# Patient Record
Sex: FEMALE | Race: WHITE | NOT HISPANIC OR LATINO | URBAN - METROPOLITAN AREA
[De-identification: names, ages, dates, MRNs, and addresses within clinical notes are randomized per-mention and may not be internally consistent; named-entity substitution may affect disease eponyms.]

---

## 2021-03-17 ENCOUNTER — OUTPATIENT (OUTPATIENT)
Dept: OUTPATIENT SERVICES | Facility: HOSPITAL | Age: 46
LOS: 1 days | Discharge: HOME | End: 2021-03-17
Payer: COMMERCIAL

## 2021-03-17 PROCEDURE — 72197 MRI PELVIS W/O & W/DYE: CPT | Mod: 26

## 2025-02-21 PROBLEM — Z00.00 ENCOUNTER FOR PREVENTIVE HEALTH EXAMINATION: Status: ACTIVE | Noted: 2025-02-21

## 2025-03-03 PROBLEM — Z80.3 FAMILY HISTORY OF MALIGNANT NEOPLASM OF BREAST: Status: ACTIVE | Noted: 2025-03-03

## 2025-03-03 NOTE — PAST MEDICAL HISTORY
[Menstruating] : The patient is menstruating [Menarche Age ____] : age at menarche was [unfilled] [Excessive Bleeding] : there was excessive bleeding [Total Preg ___] : G[unfilled] [Living ___] : Living: [unfilled]

## 2025-03-07 ENCOUNTER — APPOINTMENT (OUTPATIENT)
Dept: GYNECOLOGIC ONCOLOGY | Facility: CLINIC | Age: 50
End: 2025-03-07
Payer: COMMERCIAL

## 2025-03-07 VITALS
SYSTOLIC BLOOD PRESSURE: 167 MMHG | HEIGHT: 60 IN | WEIGHT: 166 LBS | DIASTOLIC BLOOD PRESSURE: 97 MMHG | HEART RATE: 111 BPM | BODY MASS INDEX: 32.59 KG/M2

## 2025-03-07 DIAGNOSIS — D25.0 SUBMUCOUS LEIOMYOMA OF UTERUS: ICD-10-CM

## 2025-03-07 DIAGNOSIS — Z80.3 FAMILY HISTORY OF MALIGNANT NEOPLASM OF BREAST: ICD-10-CM

## 2025-03-07 DIAGNOSIS — N92.0 EXCESSIVE AND FREQUENT MENSTRUATION WITH REGULAR CYCLE: ICD-10-CM

## 2025-03-07 PROCEDURE — 99204 OFFICE O/P NEW MOD 45 MIN: CPT

## 2025-03-07 PROCEDURE — 99459 PELVIC EXAMINATION: CPT | Mod: NC

## 2025-03-07 NOTE — ASSESSMENT
[FreeTextEntry1] : 49-year-old, para 2-0-0-2,  x 2, patient of Dr. Guillen with recurrent menorrhagia not responsive to multiple conservative management, now presenting for definitive surgery.

## 2025-03-07 NOTE — PLAN
[TextEntry] : EUA TLH/BS and all indicated procedures. Options for surgical management discussed. Procedures offered patient included laparoscopic hysterectomy with bilateral salpingectomies along with possible bilateral oophorectomies. She is requesting to preserve her ovaries if normal and is opting for  a TLH/BS.  The risk benefits and alternative to the recommended treatments were discussed. She was informed about potential complications of surgery including but not limited to bowel, bladder, and ureteral injuries. Infectious morbidity, along with bleeding and thromboembolic events were also discussed.   She showed clear understanding, was given the opportunity to ask questions and is amenable to the above surgical treatment. The patient will be setup for the above procedure in the near future.

## 2025-03-07 NOTE — PHYSICAL EXAM
[Chaperone Present] : A chaperone was present in the examining room during all aspects of the physical examination [FreeTextEntry2] : Justine Vivas [TextEntry] : Well - developed, well-nourished female in no acute distress HEENT - wnl Breasts - symmetrical w/o masses or nipple discharge Abdomen - soft, non-tender, +BS Back - no CVA tenderness or spinal tenderness Extremities - w/o clubbing, cyanosis, no lesions noted Neuro - AAOx3  Pelvic Exam External Genitalia - with out lesions Vagina - mucosa atrophic, no lesions noted Cervix - no lesions Uterus - 16 wk size, nontender, mobile Adnexa - no palpable masses or tenderness b/l Rectovaginal - confirmatory

## 2025-03-07 NOTE — HISTORY OF PRESENT ILLNESS
[FreeTextEntry1] : 49 year old, para 2-0-0-2,  x 2, patient of Dr. Guillen. LMP unknown, patient states she has been bleeding x 6 weeks. She does not recall her last normal period. She has a history of D&C in , Uterine Fibroid Embolization in , and Lupron therapy for menorrhagia without success.  She had IUD inserted approximately 1.5 years ago with symptoms resolving.  She states she started bleeding again about 6 weeks ago and is presently on Tranaxemic Acid. She has a family history of breast cancer, (grandmother and aunt) but is BRCA negative.  She presents today for initial office evaluation, requesting definitive hysterectomy.

## 2025-03-14 ENCOUNTER — OUTPATIENT (OUTPATIENT)
Dept: OUTPATIENT SERVICES | Facility: HOSPITAL | Age: 50
LOS: 1 days | End: 2025-03-14
Payer: COMMERCIAL

## 2025-03-14 VITALS
TEMPERATURE: 98 F | WEIGHT: 169.98 LBS | RESPIRATION RATE: 18 BRPM | OXYGEN SATURATION: 98 % | HEART RATE: 80 BPM | DIASTOLIC BLOOD PRESSURE: 90 MMHG | HEIGHT: 60 IN | SYSTOLIC BLOOD PRESSURE: 130 MMHG

## 2025-03-14 DIAGNOSIS — D25.9 LEIOMYOMA OF UTERUS, UNSPECIFIED: Chronic | ICD-10-CM

## 2025-03-14 DIAGNOSIS — D25.0 SUBMUCOUS LEIOMYOMA OF UTERUS: ICD-10-CM

## 2025-03-14 DIAGNOSIS — Z98.890 OTHER SPECIFIED POSTPROCEDURAL STATES: Chronic | ICD-10-CM

## 2025-03-14 DIAGNOSIS — Z90.89 ACQUIRED ABSENCE OF OTHER ORGANS: Chronic | ICD-10-CM

## 2025-03-14 DIAGNOSIS — Z01.818 ENCOUNTER FOR OTHER PREPROCEDURAL EXAMINATION: ICD-10-CM

## 2025-03-14 LAB
ALBUMIN SERPL ELPH-MCNC: 4.3 G/DL — SIGNIFICANT CHANGE UP (ref 3.5–5.2)
ALP SERPL-CCNC: 67 U/L — SIGNIFICANT CHANGE UP (ref 30–115)
ALT FLD-CCNC: 39 U/L — SIGNIFICANT CHANGE UP (ref 0–41)
ANION GAP SERPL CALC-SCNC: 9 MMOL/L — SIGNIFICANT CHANGE UP (ref 7–14)
AST SERPL-CCNC: 32 U/L — SIGNIFICANT CHANGE UP (ref 0–41)
BASOPHILS # BLD AUTO: 0.05 K/UL — SIGNIFICANT CHANGE UP (ref 0–0.2)
BASOPHILS NFR BLD AUTO: 0.7 % — SIGNIFICANT CHANGE UP (ref 0–1)
BILIRUB SERPL-MCNC: 0.3 MG/DL — SIGNIFICANT CHANGE UP (ref 0.2–1.2)
BLD GP AB SCN SERPL QL: SIGNIFICANT CHANGE UP
BUN SERPL-MCNC: 18 MG/DL — SIGNIFICANT CHANGE UP (ref 10–20)
CALCIUM SERPL-MCNC: 10 MG/DL — SIGNIFICANT CHANGE UP (ref 8.4–10.5)
CHLORIDE SERPL-SCNC: 104 MMOL/L — SIGNIFICANT CHANGE UP (ref 98–110)
CO2 SERPL-SCNC: 26 MMOL/L — SIGNIFICANT CHANGE UP (ref 17–32)
CREAT SERPL-MCNC: 0.6 MG/DL — LOW (ref 0.7–1.5)
EGFR: 111 ML/MIN/1.73M2 — SIGNIFICANT CHANGE UP
EGFR: 111 ML/MIN/1.73M2 — SIGNIFICANT CHANGE UP
EOSINOPHIL # BLD AUTO: 0.14 K/UL — SIGNIFICANT CHANGE UP (ref 0–0.7)
EOSINOPHIL NFR BLD AUTO: 1.9 % — SIGNIFICANT CHANGE UP (ref 0–8)
GLUCOSE SERPL-MCNC: 84 MG/DL — SIGNIFICANT CHANGE UP (ref 70–99)
HCT VFR BLD CALC: 40.1 % — SIGNIFICANT CHANGE UP (ref 37–47)
HGB BLD-MCNC: 13.2 G/DL — SIGNIFICANT CHANGE UP (ref 12–16)
IMM GRANULOCYTES NFR BLD AUTO: 0.5 % — HIGH (ref 0.1–0.3)
LYMPHOCYTES # BLD AUTO: 2.11 K/UL — SIGNIFICANT CHANGE UP (ref 1.2–3.4)
LYMPHOCYTES # BLD AUTO: 28.4 % — SIGNIFICANT CHANGE UP (ref 20.5–51.1)
MCHC RBC-ENTMCNC: 28.9 PG — SIGNIFICANT CHANGE UP (ref 27–31)
MCHC RBC-ENTMCNC: 32.9 G/DL — SIGNIFICANT CHANGE UP (ref 32–37)
MCV RBC AUTO: 87.9 FL — SIGNIFICANT CHANGE UP (ref 81–99)
MONOCYTES # BLD AUTO: 0.62 K/UL — HIGH (ref 0.1–0.6)
MONOCYTES NFR BLD AUTO: 8.3 % — SIGNIFICANT CHANGE UP (ref 1.7–9.3)
NEUTROPHILS # BLD AUTO: 4.47 K/UL — SIGNIFICANT CHANGE UP (ref 1.4–6.5)
NEUTROPHILS NFR BLD AUTO: 60.2 % — SIGNIFICANT CHANGE UP (ref 42.2–75.2)
NRBC BLD AUTO-RTO: 0 /100 WBCS — SIGNIFICANT CHANGE UP (ref 0–0)
PLATELET # BLD AUTO: 277 K/UL — SIGNIFICANT CHANGE UP (ref 130–400)
PMV BLD: 11.8 FL — HIGH (ref 7.4–10.4)
POTASSIUM SERPL-MCNC: 4.5 MMOL/L — SIGNIFICANT CHANGE UP (ref 3.5–5)
POTASSIUM SERPL-SCNC: 4.5 MMOL/L — SIGNIFICANT CHANGE UP (ref 3.5–5)
PROT SERPL-MCNC: 6.4 G/DL — SIGNIFICANT CHANGE UP (ref 6–8)
RBC # BLD: 4.56 M/UL — SIGNIFICANT CHANGE UP (ref 4.2–5.4)
RBC # FLD: 13 % — SIGNIFICANT CHANGE UP (ref 11.5–14.5)
SODIUM SERPL-SCNC: 139 MMOL/L — SIGNIFICANT CHANGE UP (ref 135–146)
WBC # BLD: 7.43 K/UL — SIGNIFICANT CHANGE UP (ref 4.8–10.8)
WBC # FLD AUTO: 7.43 K/UL — SIGNIFICANT CHANGE UP (ref 4.8–10.8)

## 2025-03-14 PROCEDURE — 86901 BLOOD TYPING SEROLOGIC RH(D): CPT

## 2025-03-14 PROCEDURE — 36415 COLL VENOUS BLD VENIPUNCTURE: CPT

## 2025-03-14 PROCEDURE — 86900 BLOOD TYPING SEROLOGIC ABO: CPT

## 2025-03-14 PROCEDURE — 80053 COMPREHEN METABOLIC PANEL: CPT

## 2025-03-14 PROCEDURE — 99214 OFFICE O/P EST MOD 30 MIN: CPT | Mod: 25

## 2025-03-14 PROCEDURE — 85025 COMPLETE CBC W/AUTO DIFF WBC: CPT

## 2025-03-14 PROCEDURE — 86850 RBC ANTIBODY SCREEN: CPT

## 2025-03-14 NOTE — H&P PST ADULT - REASON FOR ADMISSION
Patient is a  49 year old  female presenting to PAST in preparation for   EXAM UNDER ANESTHESIA TOTAL LAPAROSCOPIC HYSTERECTOMY BILATERAL SALPINGECTOMY AND ALL OTHER INDICATED PROCEDURES  on 3/20/25   under  general anesthesia by Dr. Juárez

## 2025-03-14 NOTE — H&P PST ADULT - NSICDXPASTSURGICALHX_GEN_ALL_CORE_FT
PAST SURGICAL HISTORY:  Fibroid uterus     H/O bilateral breast reduction surgery     History of tonsillectomy

## 2025-03-14 NOTE — H&P PST ADULT - ATTENDING COMMENTS
49-year-old, para 2-0-0-2,  x 2, patient of Dr. Hanks with recurrent menorrhagia not responsive to multiple conservative management, now presenting for definitive surgery.     Assessment  Menorrhagia with regular cycle (626.2) (N92.0)  Fibroids, submucosal (218.0) (D25.0)    Plan  EUA TLH/BS and all indicated procedures.  Options for surgical management discussed. Procedures offered patient included laparoscopic hysterectomy with bilateral salpingectomies along with possible bilateral oophorectomies. She is requesting to preserve her ovaries if normal and is opting for a TLH/BS.    The risk benefits and alternative to the recommended treatments were discussed. She was informed about potential complications of surgery including but not limited to bowel, bladder, and ureteral injuries. Infectious morbidity, along with bleeding and thromboembolic events were also discussed.  She showed clear understanding, was given the opportunity to ask questions and is amenable to the above surgical treatment.

## 2025-03-15 DIAGNOSIS — D25.0 SUBMUCOUS LEIOMYOMA OF UTERUS: ICD-10-CM

## 2025-03-15 DIAGNOSIS — Z01.818 ENCOUNTER FOR OTHER PREPROCEDURAL EXAMINATION: ICD-10-CM

## 2025-03-19 NOTE — ASU PATIENT PROFILE, ADULT - REASON FOR ADMISSION, PROFILE
Pt. stated. "I'm here for laparoscopic hysterectomy, removal of tubes and other indicated procedures."

## 2025-03-20 ENCOUNTER — APPOINTMENT (OUTPATIENT)
Dept: GYNECOLOGIC ONCOLOGY | Facility: HOSPITAL | Age: 50
End: 2025-03-20

## 2025-03-20 ENCOUNTER — RESULT REVIEW (OUTPATIENT)
Age: 50
End: 2025-03-20

## 2025-03-20 ENCOUNTER — TRANSCRIPTION ENCOUNTER (OUTPATIENT)
Age: 50
End: 2025-03-20

## 2025-03-20 ENCOUNTER — OUTPATIENT (OUTPATIENT)
Dept: OUTPATIENT SERVICES | Facility: HOSPITAL | Age: 50
LOS: 1 days | Discharge: HOME | End: 2025-03-20
Payer: COMMERCIAL

## 2025-03-20 VITALS
OXYGEN SATURATION: 98 % | RESPIRATION RATE: 16 BRPM | DIASTOLIC BLOOD PRESSURE: 78 MMHG | HEART RATE: 100 BPM | SYSTOLIC BLOOD PRESSURE: 123 MMHG

## 2025-03-20 VITALS
DIASTOLIC BLOOD PRESSURE: 80 MMHG | HEART RATE: 105 BPM | SYSTOLIC BLOOD PRESSURE: 123 MMHG | WEIGHT: 169.98 LBS | OXYGEN SATURATION: 99 % | TEMPERATURE: 98 F | RESPIRATION RATE: 18 BRPM | HEIGHT: 60 IN

## 2025-03-20 DIAGNOSIS — Z98.890 OTHER SPECIFIED POSTPROCEDURAL STATES: Chronic | ICD-10-CM

## 2025-03-20 DIAGNOSIS — Z90.89 ACQUIRED ABSENCE OF OTHER ORGANS: Chronic | ICD-10-CM

## 2025-03-20 DIAGNOSIS — D25.9 LEIOMYOMA OF UTERUS, UNSPECIFIED: Chronic | ICD-10-CM

## 2025-03-20 DIAGNOSIS — D25.0 SUBMUCOUS LEIOMYOMA OF UTERUS: ICD-10-CM

## 2025-03-20 PROCEDURE — 58573 TLH W/T/O UTERUS OVER 250 G: CPT

## 2025-03-20 PROCEDURE — 88300 SURGICAL PATH GROSS: CPT | Mod: 26,59

## 2025-03-20 PROCEDURE — C1889: CPT

## 2025-03-20 PROCEDURE — 88307 TISSUE EXAM BY PATHOLOGIST: CPT | Mod: 26

## 2025-03-20 PROCEDURE — 58825 TRANSPOSITION OVARY(S): CPT | Mod: 59

## 2025-03-20 PROCEDURE — C9399: CPT

## 2025-03-20 PROCEDURE — 57425 LAPAROSCOPY SURG COLPOPEXY: CPT

## 2025-03-20 PROCEDURE — 88307 TISSUE EXAM BY PATHOLOGIST: CPT

## 2025-03-20 PROCEDURE — 88302 TISSUE EXAM BY PATHOLOGIST: CPT

## 2025-03-20 PROCEDURE — 88302 TISSUE EXAM BY PATHOLOGIST: CPT | Mod: 26

## 2025-03-20 PROCEDURE — 88300 SURGICAL PATH GROSS: CPT

## 2025-03-20 RX ORDER — ACETAMINOPHEN 500 MG/5ML
1000 LIQUID (ML) ORAL ONCE
Refills: 0 | Status: COMPLETED | OUTPATIENT
Start: 2025-03-20 | End: 2025-03-20

## 2025-03-20 RX ORDER — ONDANSETRON HCL/PF 4 MG/2 ML
4 VIAL (ML) INJECTION ONCE
Refills: 0 | Status: COMPLETED | OUTPATIENT
Start: 2025-03-20 | End: 2025-03-20

## 2025-03-20 RX ORDER — SODIUM CHLORIDE 9 G/1000ML
1000 INJECTION, SOLUTION INTRAVENOUS
Refills: 0 | Status: DISCONTINUED | OUTPATIENT
Start: 2025-03-20 | End: 2025-03-20

## 2025-03-20 RX ORDER — OXYCODONE HYDROCHLORIDE 30 MG/1
1 TABLET ORAL
Qty: 4 | Refills: 0
Start: 2025-03-20 | End: 2025-03-20

## 2025-03-20 RX ADMIN — Medication 4 MILLIGRAM(S): at 11:47

## 2025-03-20 RX ADMIN — Medication 400 MILLIGRAM(S): at 12:00

## 2025-03-20 RX ADMIN — Medication 1000 MILLIGRAM(S): at 12:30

## 2025-03-20 NOTE — BRIEF OPERATIVE NOTE - OPERATION/FINDINGS
14 week sized fibroid uterus. Normal appearing tubes and ovaries bilaterally. Mirena IUD in uterus. Frozen: benign fibroids 14 week sized fibroid uterus. Normal appearing tubes and ovaries bilaterally. Mirena IUD in uterus. Frozen: benign fibroids. 607g

## 2025-03-20 NOTE — ASU DISCHARGE PLAN (ADULT/PEDIATRIC) - NS MD DC FALL RISK RISK
For information on Fall & Injury Prevention, visit: [email or web address] OR  [email or web address] OR  https://www.cdc.gov/steadi/patient.html

## 2025-03-20 NOTE — BRIEF OPERATIVE NOTE - NSICDXBRIEFPREOP_GEN_ALL_CORE_FT
PRE-OP DIAGNOSIS:  Fibroid uterus 20-Mar-2025 11:31:59  Chantal Murry  Menorrhagia 20-Mar-2025 11:32:03  Chantal Murry

## 2025-03-20 NOTE — ASU DISCHARGE PLAN (ADULT/PEDIATRIC) - FINANCIAL ASSISTANCE
[facility] provides services at a reduced cost to those who are determined to be eligible through [facility]’s financial assistance program. Information regarding [facility]’s financial assistance program can be found by going to [email or web address] or by calling [phone].

## 2025-03-20 NOTE — BRIEF OPERATIVE NOTE - NSICDXBRIEFPROCEDURE_GEN_ALL_CORE_FT
PROCEDURES:  Laparoscopic hysterectomy, total, uterus greater than 250 g 20-Mar-2025 11:31:22  Chantal Murry  Laparoscopic bilateral salpingectomy 20-Mar-2025 11:31:42  Chantal Murry  Uterosacral colpopexy 20-Mar-2025 11:31:50  Chantal Murry

## 2025-03-20 NOTE — BRIEF OPERATIVE NOTE - SPECIMENS
cervix, uterus, bilateral tubes and ovaries cervix, uterus, bilateral tubes and ovaries. Mirena IUD.

## 2025-03-20 NOTE — BRIEF OPERATIVE NOTE - NSICDXBRIEFPOSTOP_GEN_ALL_CORE_FT
POST-OP DIAGNOSIS:  Menorrhagia 20-Mar-2025 11:32:17  Chantal Murry  Fibroid uterus 20-Mar-2025 11:32:11  Chantal Murry

## 2025-03-20 NOTE — ASU DISCHARGE PLAN (ADULT/PEDIATRIC) - CARE PROVIDER_API CALL
Siena Juárez  Gynecologic Oncology  [address] [place] [address], [address] [place] [address]  Phone: [phone]  Fax: [phone]  Follow Up Time:

## 2025-03-20 NOTE — ASU DISCHARGE PLAN (ADULT/PEDIATRIC) - ASU DC SPECIAL INSTRUCTIONSFT
DIET  - You may resume your normal diet. Eat a well-balanced diet. You may prefer to eat light meals for the first few days after surgery.  Drink plenty of water (6-8 glasses a day).    - Please take Senna (stool softener) daily until you have normal bowel movements.  If you have constipation or don't have a bowel movement 2-3 days after surgery, please try a mild laxative such as Miralax.   -Take simethicone (Gas-X) to prevent gas pain every 4-6 hours for the first 3-4 days after surgery.  - You may take zofran as needed for nausea (this dissolves under your tongue).     ACTIVITY:   - No heavy lifting/pushing/pulling for 6 weeks. Do not lift anything more than 10 lbs (such as laundry, groceries, children, pets), vacuum, push heavy doors or grocery carts, etc, for 6 weeks.  - You may climb stairs as tolerated.  -  Do not put anything in the vagina for at least 6 weeks after surgery unless otherwise instructed by your doctor (including tampons, douching, sexual intercourse, etc).  -  No driving for 1 week after surgery and not while taking narcotic pain medication. Drive defensively when you are ready.  -  Avoid sitting or lying in bed for more than 2 hours at a time while you are awake to reduce your risk of blood clots.    WOUND CARE: You have 4 incisions that are covered with surgical glue (Dermabond).  After 24 hours you may get your incisions wet.  Do not submerge in water (no tub baths or pools), showering is OK.  The Dermabond will loosen from the skin and fall off in 5 to 10 days.  Do not apply any ointments, lotions, creams or tape over the Dermabond.    PAIN MANAGEMENT:   Alternate Tylenol and ibuprofen/Motrin (if you are eligible). Each of these medications can be taken every six hours. Try to stagger them so that you are taking something for pain every three hours (ex. Take Motrin at 12:00, Tylenol at 3:00, Motrin at 6:00, etc.) to maximize pain relief.  - Tylenol – 975mg every 6 hours as needed. The maximum dose of Tylenol is 4000 mg in 24 hours.  - Motrin/Ibuprofen - 600-800mg mg every 6 hours as needed (try to take with food). The maximum dose of Motrin/ibuprofen is 2400mg in 24 hours  - Oxycodone 5mg every 6 hours as needed for severe pain (limit use as this is a narcotic and can cause sedation, nausea and constipation.  -  A warm shower, heating pad, and/or walking may help.    WHAT TO EXPECT AT HOME  - Recovery from surgery is generally 2-4 weeks, but sometimes longer for more strenuous activity. It is normal to be very tired during this time.  - It is normal to have some drainage or a small amount of vaginal bleeding after surgery that would require the use of a light pantiliner. This discharge may last up to 6 weeks. The bleeding and discharge should be light and should have no odor.  - You may experience gas pain, abdominal swelling, or shoulder pain for 24-72 hours after surgery. This is from the carbon dioxide gas put into your abdomen to better visualize your organs. A warm shower, heating pad, and/or walking may help.    WHEN TO CALL YOUR DOCTOR:  - Fever (>100.4°F or 38.0°C) or chills  - Incision problems such as redness, warmth, swelling, or foul smelling drainage.  - Severe nausea or persistent vomiting.  - Bright red vaginal bleeding (soaking >1 pad/hour) or foul smelling vaginal drainage.  - Severe pain not relieved with pain medication.  - Pain with urination, cloudy urine, or foul smelling urine.  - Or if you have any other problems or questions.

## 2025-03-21 LAB — SURGICAL PATHOLOGY STUDY: SIGNIFICANT CHANGE UP

## 2025-03-28 DIAGNOSIS — D25.9 LEIOMYOMA OF UTERUS, UNSPECIFIED: ICD-10-CM

## 2025-03-28 DIAGNOSIS — Z30.432 ENCOUNTER FOR REMOVAL OF INTRAUTERINE CONTRACEPTIVE DEVICE: ICD-10-CM

## 2025-03-28 DIAGNOSIS — N83.8 OTHER NONINFLAMMATORY DISORDERS OF OVARY, FALLOPIAN TUBE AND BROAD LIGAMENT: ICD-10-CM

## 2025-03-28 DIAGNOSIS — N92.0 EXCESSIVE AND FREQUENT MENSTRUATION WITH REGULAR CYCLE: ICD-10-CM

## 2025-03-28 DIAGNOSIS — N93.9 ABNORMAL UTERINE AND VAGINAL BLEEDING, UNSPECIFIED: ICD-10-CM

## 2025-04-01 ENCOUNTER — APPOINTMENT (OUTPATIENT)
Dept: GYNECOLOGIC ONCOLOGY | Facility: CLINIC | Age: 50
End: 2025-04-01
Payer: COMMERCIAL

## 2025-04-01 VITALS
SYSTOLIC BLOOD PRESSURE: 130 MMHG | WEIGHT: 166 LBS | HEIGHT: 60 IN | BODY MASS INDEX: 32.59 KG/M2 | DIASTOLIC BLOOD PRESSURE: 91 MMHG | HEART RATE: 114 BPM

## 2025-04-01 DIAGNOSIS — Z90.710 ACQUIRED ABSENCE OF BOTH CERVIX AND UTERUS: ICD-10-CM

## 2025-04-01 PROBLEM — N92.6 IRREGULAR MENSTRUATION, UNSPECIFIED: Chronic | Status: ACTIVE | Noted: 2025-03-14

## 2025-04-01 PROCEDURE — 99024 POSTOP FOLLOW-UP VISIT: CPT

## 2025-04-01 RX ORDER — METRONIDAZOLE 500 MG/1
500 TABLET ORAL
Qty: 20 | Refills: 0 | Status: ACTIVE | COMMUNITY
Start: 2025-04-01 | End: 1900-01-01

## 2025-04-01 NOTE — ASSESSMENT
[FreeTextEntry1] : 49 year old, para 2-0-0-2,  x 2, patient of Dr. Guillen. LMP unknown, patient states she has been bleeding x 6 weeks. She does not recall her last normal period. She has a history of D&C in , Uterine Fibroid Embolization in , and Lupron therapy for menorrhagia without success. She had IUD inserted approximately 1.5 years ago with symptoms resolving. She states she started bleeding again about 6 weeks ago and is presently on Tranaxemic Acid. She has a family history of breast cancer, (grandmother and aunt) but is BRCA negative.  -s/p TLH, BS on 25 38138-CJE/BS  Uterus>250gms (607Grams)                                                                                                                                41293-Hpbprsoskay surgical   Colpopexy (Susp of vag apex)                                                                              25633-26 Suspension of Rt Ovary                                                         Operative findings Uterus 16 wks. 607 gms with T&O`swnl.   Right ovarian torsion secondary to elongated pedicle required Suspension.                               FS= Benign degenerating myomas.  Final Pathology: Surgical Pathology Report - Auth (Verified)  Specimen(s) Submitted 1  Right fallopian tube 2  Left fallopian tube 3  Uterus and cervix 4  IUD  Final Diagnosis 1. Fallopian tube, right, salpingectomy:  - Benign fallopian tube with paratubal cysts.  2. Fallopian tube, left, salpingectomy: - Benign fallopian tube.  3. Uterus and cervix, total laparoscopic hysterectomy: - Benign inactive endometrium. - Myometrium with leiomyomata with extensive degenerative changes and calcifications. - Prior procedure/uterine fibroid embolization related changes. - Benign cervix.  4. IUD, removal: - Intrauterine device (gross examination only). Verified by: Aracelis Looney M.D. (Electronic Signature) Reported on: 25 18:00 EDT, [facility], [address] [place] [address] Phone: [phone]   Fax: [phone]  Presents today for initial post op evaluation.

## 2025-04-01 NOTE — ASSESSMENT
[FreeTextEntry1] : 49 year old, para 2-0-0-2,  x 2, patient of Dr. Guillen. LMP unknown, patient states she has been bleeding x 6 weeks. She does not recall her last normal period. She has a history of D&C in , Uterine Fibroid Embolization in , and Lupron therapy for menorrhagia without success. She had IUD inserted approximately 1.5 years ago with symptoms resolving. She states she started bleeding again about 6 weeks ago and is presently on Tranaxemic Acid. She has a family history of breast cancer, (grandmother and aunt) but is BRCA negative.  -s/p TLH, BS on 25 25573-RHV/BS  Uterus>250gms (607Grams)                                                                                                                                20170-Mrhegecktta surgical   Colpopexy (Susp of vag apex)                                                                              01564-51 Suspension of Rt Ovary                                                         Operative findings Uterus 16 wks. 607 gms with T&O`swnl.   Right ovarian torsion secondary to elongated pedicle required Suspension.                               FS= Benign degenerating myomas.  Final Pathology: Surgical Pathology Report - Auth (Verified)  Specimen(s) Submitted 1  Right fallopian tube 2  Left fallopian tube 3  Uterus and cervix 4  IUD  Final Diagnosis 1. Fallopian tube, right, salpingectomy:  - Benign fallopian tube with paratubal cysts.  2. Fallopian tube, left, salpingectomy: - Benign fallopian tube.  3. Uterus and cervix, total laparoscopic hysterectomy: - Benign inactive endometrium. - Myometrium with leiomyomata with extensive degenerative changes and calcifications. - Prior procedure/uterine fibroid embolization related changes. - Benign cervix.  4. IUD, removal: - Intrauterine device (gross examination only). Verified by: Aracelis Looney M.D. (Electronic Signature) Reported on: 25 18:00 EDT, [facility], [address] [place] [address] Phone: [phone]   Fax: [phone]  Presents today for initial post op evaluation.

## 2025-05-13 ENCOUNTER — APPOINTMENT (OUTPATIENT)
Dept: GYNECOLOGIC ONCOLOGY | Facility: CLINIC | Age: 50
End: 2025-05-13
Payer: COMMERCIAL

## 2025-05-13 VITALS
HEIGHT: 60 IN | BODY MASS INDEX: 32.59 KG/M2 | HEART RATE: 103 BPM | WEIGHT: 166 LBS | DIASTOLIC BLOOD PRESSURE: 82 MMHG | SYSTOLIC BLOOD PRESSURE: 152 MMHG

## 2025-05-13 DIAGNOSIS — N92.0 EXCESSIVE AND FREQUENT MENSTRUATION WITH REGULAR CYCLE: ICD-10-CM

## 2025-05-13 DIAGNOSIS — Z86.018 PERSONAL HISTORY OF OTHER BENIGN NEOPLASM: ICD-10-CM

## 2025-05-13 PROCEDURE — 99024 POSTOP FOLLOW-UP VISIT: CPT

## 2025-05-13 NOTE — ASSESSMENT
[FreeTextEntry1] : 49 year old, para 2-0-0-2,  x 2, patient of Dr. Guillen. LMP unknown, patient states she has been bleeding x 6 weeks. She does not recall her last normal period. She has a history of D&C in , Uterine Fibroid Embolization in , and Lupron therapy for menorrhagia without success. She had IUD inserted approximately 1.5 years ago with symptoms resolving. She states she started bleeding again about 6 weeks ago and is presently on Tranaxemic Acid. She has a family history of breast cancer, (grandmother and aunt) but is BRCA negative.  -s/p TLH, BS on 25 70884-VYP/BS Uterus>250gms (607Grams)    32687-Fodhjjvdnfz surgical Colpopexy (Susp of vag apex)   40881-75 Suspension of Rt Ovary  Operative findings Uterus 16 wks. 607 gms with T&O`swnl. Right ovarian torsion secondary to elongated pedicle required Suspension. FS= Benign degenerating myomas.  Final Pathology: Surgical Pathology Report - Auth (Verified)  Specimen(s) Submitted 1 Right fallopian tube 2 Left fallopian tube 3 Uterus and cervix 4 IUD  Final Diagnosis 1. Fallopian tube, right, salpingectomy:  - Benign fallopian tube with paratubal cysts.  2. Fallopian tube, left, salpingectomy: - Benign fallopian tube.  3. Uterus and cervix, total laparoscopic hysterectomy: - Benign inactive endometrium. - Myometrium with leiomyomata with extensive degenerative changes and calcifications. - Prior procedure/uterine fibroid embolization related changes. - Benign cervix.  4. IUD, removal: - Intrauterine device (gross examination only). Verified by: Aracelis Looney M.D. (Electronic Signature) Reported on: 25 18:00 EDT, [facility], [address] [place] [address] Phone: [phone] Fax: [phone]  Presents today for vaginal cuff check. Minimal Gt at apex of cuff cauterized with silver nitrate. f/u visit in 1 week.

## 2025-05-13 NOTE — ASSESSMENT
[FreeTextEntry1] : 49 year old, para 2-0-0-2,  x 2, patient of Dr. Guillen. LMP unknown, patient states she has been bleeding x 6 weeks. She does not recall her last normal period. She has a history of D&C in , Uterine Fibroid Embolization in , and Lupron therapy for menorrhagia without success. She had IUD inserted approximately 1.5 years ago with symptoms resolving. She states she started bleeding again about 6 weeks ago and is presently on Tranaxemic Acid. She has a family history of breast cancer, (grandmother and aunt) but is BRCA negative.  -s/p TLH, BS on 25 17824-RZC/BS Uterus>250gms (607Grams)    94138-Qopydzigzbc surgical Colpopexy (Susp of vag apex)   90269-93 Suspension of Rt Ovary  Operative findings Uterus 16 wks. 607 gms with T&O`swnl. Right ovarian torsion secondary to elongated pedicle required Suspension. FS= Benign degenerating myomas.  Final Pathology: Surgical Pathology Report - Auth (Verified)  Specimen(s) Submitted 1 Right fallopian tube 2 Left fallopian tube 3 Uterus and cervix 4 IUD  Final Diagnosis 1. Fallopian tube, right, salpingectomy:  - Benign fallopian tube with paratubal cysts.  2. Fallopian tube, left, salpingectomy: - Benign fallopian tube.  3. Uterus and cervix, total laparoscopic hysterectomy: - Benign inactive endometrium. - Myometrium with leiomyomata with extensive degenerative changes and calcifications. - Prior procedure/uterine fibroid embolization related changes. - Benign cervix.  4. IUD, removal: - Intrauterine device (gross examination only). Verified by: Aracelis Looney M.D. (Electronic Signature) Reported on: 25 18:00 EDT, [facility], [address] [place] [address] Phone: [phone] Fax: [phone]  Presents today for vaginal cuff check. Minimal Gt at apex of cuff cauterized with silver nitrate. f/u visit in 1 week.

## 2025-05-20 ENCOUNTER — APPOINTMENT (OUTPATIENT)
Dept: GYNECOLOGIC ONCOLOGY | Facility: CLINIC | Age: 50
End: 2025-05-20
Payer: COMMERCIAL

## 2025-05-20 VITALS
HEART RATE: 94 BPM | DIASTOLIC BLOOD PRESSURE: 81 MMHG | WEIGHT: 166 LBS | HEIGHT: 60 IN | SYSTOLIC BLOOD PRESSURE: 104 MMHG | BODY MASS INDEX: 32.59 KG/M2

## 2025-05-20 DIAGNOSIS — N89.8 OTHER SPECIFIED NONINFLAMMATORY DISORDERS OF VAGINA: ICD-10-CM

## 2025-05-20 PROCEDURE — 99024 POSTOP FOLLOW-UP VISIT: CPT

## 2025-05-20 NOTE — ASSESSMENT
[FreeTextEntry1] : 49 year old, para 2-0-0-2,  x 2, patient of Dr. Guillen. LMP unknown, patient states she has been bleeding x 6 weeks. She does not recall her last normal period. She has a history of D&C in , Uterine Fibroid Embolization in , and Lupron therapy for menorrhagia without success. She had IUD inserted approximately 1.5 years ago with symptoms resolving. She states she started bleeding again about 6 weeks ago and is presently on Tranaxemic Acid. She has a family history of breast cancer, (grandmother and aunt) but is BRCA negative.  -s/p TLH, BS on 25 56789-TFG/BS Uterus>250gms (607Grams) 00830-Omhxvdgsjmr surgical Colpopexy (Susp of vag apex) 12764-68 Suspension of Rt Ovary Operative findings Uterus 16 wks. 607 gms with T&O`swnl. Right ovarian torsion secondary to elongated pedicle required Suspension. FS= Benign degenerating myomas.  Final Pathology: Surgical Pathology Report - Auth (Verified)  Specimen(s) Submitted 1 Right fallopian tube 2 Left fallopian tube 3 Uterus and cervix 4 IUD  Final Diagnosis 1. Fallopian tube, right, salpingectomy:  - Benign fallopian tube with paratubal cysts.  2. Fallopian tube, left, salpingectomy: - Benign fallopian tube.  3. Uterus and cervix, total laparoscopic hysterectomy: - Benign inactive endometrium. - Myometrium with leiomyomata with extensive degenerative changes and calcifications. - Prior procedure/uterine fibroid embolization related changes. - Benign cervix.  4. IUD, removal: - Intrauterine device (gross examination only). Verified by: Aracelis Looney M.D. (Electronic Signature) Reported on: 25 18:00 EDT, [facility], [address] [place] [address] Phone: [phone] Fax: [phone]  Last seen in GYN/ONC on 25, patient noted with Minimal Gt at apex of cuff cauterized with silver nitrate.  Presents today for 1 week follow up.

## 2025-05-20 NOTE — ASSESSMENT
[FreeTextEntry1] : 49 year old, para 2-0-0-2,  x 2, patient of Dr. Guillen. LMP unknown, patient states she has been bleeding x 6 weeks. She does not recall her last normal period. She has a history of D&C in , Uterine Fibroid Embolization in , and Lupron therapy for menorrhagia without success. She had IUD inserted approximately 1.5 years ago with symptoms resolving. She states she started bleeding again about 6 weeks ago and is presently on Tranaxemic Acid. She has a family history of breast cancer, (grandmother and aunt) but is BRCA negative.  -s/p TLH, BS on 25 58558-MFC/BS Uterus>250gms (607Grams) 41754-Iyaryboqinb surgical Colpopexy (Susp of vag apex) 51192-06 Suspension of Rt Ovary Operative findings Uterus 16 wks. 607 gms with T&O`swnl. Right ovarian torsion secondary to elongated pedicle required Suspension. FS= Benign degenerating myomas.  Final Pathology: Surgical Pathology Report - Auth (Verified)  Specimen(s) Submitted 1 Right fallopian tube 2 Left fallopian tube 3 Uterus and cervix 4 IUD  Final Diagnosis 1. Fallopian tube, right, salpingectomy:  - Benign fallopian tube with paratubal cysts.  2. Fallopian tube, left, salpingectomy: - Benign fallopian tube.  3. Uterus and cervix, total laparoscopic hysterectomy: - Benign inactive endometrium. - Myometrium with leiomyomata with extensive degenerative changes and calcifications. - Prior procedure/uterine fibroid embolization related changes. - Benign cervix.  4. IUD, removal: - Intrauterine device (gross examination only). Verified by: Aracelis Looney M.D. (Electronic Signature) Reported on: 25 18:00 EDT, [facility], [address] [place] [address] Phone: [phone] Fax: [phone]  Last seen in GYN/ONC on 25, patient noted with Minimal Gt at apex of cuff cauterized with silver nitrate.  Presents today for 1 week follow up.

## 2025-05-20 NOTE — DISCUSSION/SUMMARY
[Clean] : was clean [Dry] : was dry [Intact] : was intact [Erythema] : was not erythematous [Ecchymosis] : was not ecchymotic [Seroma] : had no seroma [None] : had no drainage [Normal Skin] : normal appearance [Firm] : soft [Tender] : nontender [Abnormal Bowel Sounds] : normal bowel sounds [Rebound] : no rebound tenderness [Guarding] : no guarding [Incisional Hernia] : no incisional hernia [Mass] : no palpable mass [External Genitalia Abnormal] : normal external genitalia [Vaginal Exam Abnormal] : normal vaginal exam [de-identified] : 1 mm of granulation tissue at apical vaginal vault

## 2025-05-20 NOTE — DISCUSSION/SUMMARY
[Clean] : was clean [Dry] : was dry [Intact] : was intact [Erythema] : was not erythematous [Ecchymosis] : was not ecchymotic [Seroma] : had no seroma [None] : had no drainage [Normal Skin] : normal appearance [Firm] : soft [Tender] : nontender [Abnormal Bowel Sounds] : normal bowel sounds [Rebound] : no rebound tenderness [Guarding] : no guarding [Incisional Hernia] : no incisional hernia [Mass] : no palpable mass [External Genitalia Abnormal] : normal external genitalia [Vaginal Exam Abnormal] : normal vaginal exam [de-identified] : 1 mm of granulation tissue at apical vaginal vault

## 2025-05-30 ENCOUNTER — APPOINTMENT (OUTPATIENT)
Dept: GYNECOLOGIC ONCOLOGY | Facility: CLINIC | Age: 50
End: 2025-05-30
Payer: COMMERCIAL

## 2025-05-30 VITALS
DIASTOLIC BLOOD PRESSURE: 93 MMHG | SYSTOLIC BLOOD PRESSURE: 115 MMHG | WEIGHT: 166 LBS | BODY MASS INDEX: 32.59 KG/M2 | HEIGHT: 60 IN | HEART RATE: 86 BPM

## 2025-05-30 DIAGNOSIS — Z86.018 PERSONAL HISTORY OF OTHER BENIGN NEOPLASM: ICD-10-CM

## 2025-05-30 DIAGNOSIS — Z90.710 ACQUIRED ABSENCE OF BOTH CERVIX AND UTERUS: ICD-10-CM

## 2025-05-30 PROCEDURE — 99024 POSTOP FOLLOW-UP VISIT: CPT

## 2025-05-30 NOTE — ASSESSMENT
[FreeTextEntry1] : 49 year old, para 2-0-0-2,  x 2, patient of Dr. Guillen. LMP unknown, patient states she has been bleeding x 6 weeks. She does not recall her last normal period. She has a history of D&C in , Uterine Fibroid Embolization in , and Lupron therapy for menorrhagia without success. She had IUD inserted approximately 1.5 years ago with symptoms resolving. She states she started bleeding again about 6 weeks ago and is presently on Tranaxemic Acid. She has a family history of breast cancer, (grandmother and aunt) but is BRCA negative.  -s/p TLH, BS on 25 21827-ZPH/BS Uterus>250gms (607Grams) 28424-Xedggtgywjw surgical Colpopexy (Susp of vag apex) 42039-67 Suspension of Rt Ovary Operative findings Uterus 16 wks. 607 gms with T&O`swnl. Right ovarian torsion secondary to elongated pedicle required Suspension. FS= Benign degenerating myomas.  Final Pathology: Surgical Pathology Report - Auth (Verified)  Specimen(s) Submitted 1 Right fallopian tube 2 Left fallopian tube 3 Uterus and cervix 4 IUD  Final Diagnosis 1. Fallopian tube, right, salpingectomy:  - Benign fallopian tube with paratubal cysts.  2. Fallopian tube, left, salpingectomy: - Benign fallopian tube.  3. Uterus and cervix, total laparoscopic hysterectomy: - Benign inactive endometrium. - Myometrium with leiomyomata with extensive degenerative changes and calcifications. - Prior procedure/uterine fibroid embolization related changes. - Benign cervix.  4. IUD, removal: - Intrauterine device (gross examination only). Verified by: Aracelis Looney M.D. (Electronic Signature) Reported on: 25 18:00 EDT, [facility], [address] [place] [address] Phone: [phone] Fax: [phone]  Last seen in GYN/ONC on 25, GT cauterized.  Presents today for 2 week follow up.

## 2025-05-30 NOTE — ASSESSMENT
[FreeTextEntry1] : 49 year old, para 2-0-0-2,  x 2, patient of Dr. Guillen. LMP unknown, patient states she has been bleeding x 6 weeks. She does not recall her last normal period. She has a history of D&C in , Uterine Fibroid Embolization in , and Lupron therapy for menorrhagia without success. She had IUD inserted approximately 1.5 years ago with symptoms resolving. She states she started bleeding again about 6 weeks ago and is presently on Tranaxemic Acid. She has a family history of breast cancer, (grandmother and aunt) but is BRCA negative.  -s/p TLH, BS on 25 17189-AJI/BS Uterus>250gms (607Grams) 06808-Ziknjsbzsds surgical Colpopexy (Susp of vag apex) 36460-70 Suspension of Rt Ovary Operative findings Uterus 16 wks. 607 gms with T&O`swnl. Right ovarian torsion secondary to elongated pedicle required Suspension. FS= Benign degenerating myomas.  Final Pathology: Surgical Pathology Report - Auth (Verified)  Specimen(s) Submitted 1 Right fallopian tube 2 Left fallopian tube 3 Uterus and cervix 4 IUD  Final Diagnosis 1. Fallopian tube, right, salpingectomy:  - Benign fallopian tube with paratubal cysts.  2. Fallopian tube, left, salpingectomy: - Benign fallopian tube.  3. Uterus and cervix, total laparoscopic hysterectomy: - Benign inactive endometrium. - Myometrium with leiomyomata with extensive degenerative changes and calcifications. - Prior procedure/uterine fibroid embolization related changes. - Benign cervix.  4. IUD, removal: - Intrauterine device (gross examination only). Verified by: Aracelis Looney M.D. (Electronic Signature) Reported on: 25 18:00 EDT, [facility], [address] [place] [address] Phone: [phone] Fax: [phone]  Last seen in GYN/ONC on 25, GT cauterized.  Presents today for 2 week follow up.

## 2025-06-13 ENCOUNTER — APPOINTMENT (OUTPATIENT)
Dept: GYNECOLOGIC ONCOLOGY | Facility: CLINIC | Age: 50
End: 2025-06-13
Payer: COMMERCIAL

## 2025-06-13 VITALS
SYSTOLIC BLOOD PRESSURE: 156 MMHG | WEIGHT: 166 LBS | BODY MASS INDEX: 32.59 KG/M2 | HEIGHT: 60 IN | DIASTOLIC BLOOD PRESSURE: 88 MMHG | HEART RATE: 113 BPM

## 2025-06-13 PROCEDURE — 99024 POSTOP FOLLOW-UP VISIT: CPT

## 2025-06-13 NOTE — ASSESSMENT
[FreeTextEntry1] : 49 year old, para 2-0-0-2,  x 2, patient of Dr. Guillen. LMP unknown, patient states she has been bleeding x 6 weeks. She does not recall her last normal period. She has a history of D&C in , Uterine Fibroid Embolization in , and Lupron therapy for menorrhagia without success. She had IUD inserted approximately 1.5 years ago with symptoms resolving. She states she started bleeding again about 6 weeks ago and is presently on Tranaxemic Acid. She has a family history of breast cancer, (grandmother and aunt) but is BRCA negative.  -s/p TLH, BS on 25 78672-UQR/BS Uterus>250gms (607Grams) 15486-Ihgrhrszxnn surgical Colpopexy (Susp of vag apex) 95464-19 Suspension of Rt Ovary Operative findings Uterus 16 wks. 607 gms with T&O`swnl. Right ovarian torsion secondary to elongated pedicle required Suspension. FS= Benign degenerating myomas.  Final Pathology: Surgical Pathology Report - Auth (Verified)  Specimen(s) Submitted 1 Right fallopian tube 2 Left fallopian tube 3 Uterus and cervix 4 IUD  Final Diagnosis 1. Fallopian tube, right, salpingectomy:  - Benign fallopian tube with paratubal cysts.  2. Fallopian tube, left, salpingectomy: - Benign fallopian tube.  3. Uterus and cervix, total laparoscopic hysterectomy: - Benign inactive endometrium. - Myometrium with leiomyomata with extensive degenerative changes and calcifications. - Prior procedure/uterine fibroid embolization related changes. - Benign cervix.  4. IUD, removal: - Intrauterine device (gross examination only). Verified by: Aracelis Looney M.D. (Electronic Signature) Reported on: 25 18:00 EDT, [facility], [address] [place] [address] Phone: [phone] Fax: [phone]  Last seen in GYN/ONC on 25, GT at apical vaginal cuff 2mm noted, silver nitrate utilized.  Presents today for 2 week follow up.

## 2025-06-13 NOTE — ASSESSMENT
[FreeTextEntry1] : 49 year old, para 2-0-0-2,  x 2, patient of Dr. Guillen. LMP unknown, patient states she has been bleeding x 6 weeks. She does not recall her last normal period. She has a history of D&C in , Uterine Fibroid Embolization in , and Lupron therapy for menorrhagia without success. She had IUD inserted approximately 1.5 years ago with symptoms resolving. She states she started bleeding again about 6 weeks ago and is presently on Tranaxemic Acid. She has a family history of breast cancer, (grandmother and aunt) but is BRCA negative.  -s/p TLH, BS on 25 86881-RJK/BS Uterus>250gms (607Grams) 72747-Rbveaojutpq surgical Colpopexy (Susp of vag apex) 46959-14 Suspension of Rt Ovary Operative findings Uterus 16 wks. 607 gms with T&O`swnl. Right ovarian torsion secondary to elongated pedicle required Suspension. FS= Benign degenerating myomas.  Final Pathology: Surgical Pathology Report - Auth (Verified)  Specimen(s) Submitted 1 Right fallopian tube 2 Left fallopian tube 3 Uterus and cervix 4 IUD  Final Diagnosis 1. Fallopian tube, right, salpingectomy:  - Benign fallopian tube with paratubal cysts.  2. Fallopian tube, left, salpingectomy: - Benign fallopian tube.  3. Uterus and cervix, total laparoscopic hysterectomy: - Benign inactive endometrium. - Myometrium with leiomyomata with extensive degenerative changes and calcifications. - Prior procedure/uterine fibroid embolization related changes. - Benign cervix.  4. IUD, removal: - Intrauterine device (gross examination only). Verified by: Aracelis Looney M.D. (Electronic Signature) Reported on: 25 18:00 EDT, [facility], [address] [place] [address] Phone: [phone] Fax: [phone]  Last seen in GYN/ONC on 25, GT at apical vaginal cuff 2mm noted, silver nitrate utilized.  Presents today for 2 week follow up.